# Patient Record
Sex: FEMALE | Race: WHITE | NOT HISPANIC OR LATINO | URBAN - NONMETROPOLITAN AREA
[De-identification: names, ages, dates, MRNs, and addresses within clinical notes are randomized per-mention and may not be internally consistent; named-entity substitution may affect disease eponyms.]

---

## 2021-10-18 ENCOUNTER — OFFICE VISIT (OUTPATIENT)
Dept: URBAN - NONMETROPOLITAN AREA CLINIC 6 | Facility: CLINIC | Age: 66
End: 2021-10-18
Payer: MEDICARE

## 2021-10-18 DIAGNOSIS — H43.813 VITREOUS DEGENERATION, BILATERAL: ICD-10-CM

## 2021-10-18 DIAGNOSIS — H52.4 PRESBYOPIA: ICD-10-CM

## 2021-10-18 DIAGNOSIS — H25.13 AGE-RELATED NUCLEAR CATARACT, BILATERAL: ICD-10-CM

## 2021-10-18 DIAGNOSIS — H16.223 KERATOCONJUNCT SICCA, NOT SPECIFIED AS SJOGREN'S, BILATERAL: Primary | ICD-10-CM

## 2021-10-18 PROCEDURE — 99203 OFFICE O/P NEW LOW 30 MIN: CPT | Performed by: STUDENT IN AN ORGANIZED HEALTH CARE EDUCATION/TRAINING PROGRAM

## 2021-10-18 ASSESSMENT — KERATOMETRY
OD: 45.30
OS: 44.90

## 2021-10-18 ASSESSMENT — INTRAOCULAR PRESSURE
OD: 10
OS: 12

## 2021-10-18 NOTE — IMPRESSION/PLAN
Impression: Presbyopia: H52.4. Plan: hold new Rx bc of dry eyes. Will refract next visit. BCVA today 20/40 but unstable.

## 2021-10-18 NOTE — IMPRESSION/PLAN
Impression: Keratoconjunct sicca, not specified as Sjogren's, bilateral: P11.005. Plan: Patient educated on diagnosis. Recommend preservative-free artificial tears (Refresh Optive) 6x/day OU and warm compresses bid for 10 minutes. Additionally recommend lubricating ointment at bedtime in both eyes (Lacrilube, Refresh PM, Genteal). Additionally recommend warm compresses (with clean washcloth or clean sock filled with uncooked rice in microwave for ~45 seconds over eyes for about 10 minutes). DWP options of Restasis, Xiidra, punctal plugs, or soft steroid as second line treatment options.